# Patient Record
Sex: MALE | Race: WHITE | Employment: FULL TIME | URBAN - NONMETROPOLITAN AREA
[De-identification: names, ages, dates, MRNs, and addresses within clinical notes are randomized per-mention and may not be internally consistent; named-entity substitution may affect disease eponyms.]

---

## 2021-06-23 ENCOUNTER — OFFICE VISIT (OUTPATIENT)
Dept: ORTHOPEDIC SURGERY | Age: 66
End: 2021-06-23
Payer: COMMERCIAL

## 2021-06-23 VITALS — BODY MASS INDEX: 21.67 KG/M2 | HEIGHT: 72 IN | WEIGHT: 160 LBS

## 2021-06-23 DIAGNOSIS — M25.561 RIGHT KNEE PAIN, UNSPECIFIED CHRONICITY: Primary | ICD-10-CM

## 2021-06-23 PROCEDURE — 99203 OFFICE O/P NEW LOW 30 MIN: CPT | Performed by: ORTHOPAEDIC SURGERY

## 2021-06-23 NOTE — PROGRESS NOTES
6/23/2021     Reason for visit:  Right knee pain    History of Present Illness: The patient is a 64-year-old male who presents for evaluation of his right knee. He reports on and off pain for the past month. No traumatic injury that he is aware of. He is very active and does a lot of exercising. He does have the pain it is more medial.  No catching or locking. No instability. No swelling. Currently says he feels pretty good. Medical History:  No past medical history on file. No past surgical history on file. No family history on file. Social History     Socioeconomic History    Marital status:      Spouse name: Not on file    Number of children: Not on file    Years of education: Not on file    Highest education level: Not on file   Occupational History    Not on file   Tobacco Use    Smoking status: Never Smoker    Smokeless tobacco: Never Used   Substance and Sexual Activity    Alcohol use: Not on file    Drug use: Not on file    Sexual activity: Not on file   Other Topics Concern    Not on file   Social History Narrative    Not on file     Social Determinants of Health     Financial Resource Strain:     Difficulty of Paying Living Expenses:    Food Insecurity:     Worried About Running Out of Food in the Last Year:     [address] in the Last Year:    Transportation Needs:     Lack of Transportation (Medical):      Lack of Transportation (Non-Medical):    Physical Activity:     Days of Exercise per Week:     Minutes of Exercise per Session:    Stress:     Feeling of Stress :    Social Connections:     Frequency of Communication with Friends and Family:     Frequency of Social Gatherings with Friends and Family:     Attends [employer] Services:     Active Member of Clubs or Organizations:     Attends Club or Organization Meetings:     Marital Status:    Intimate Partner Violence:     Fear of Current or Ex-Partner:     Emotionally Abused:     Physically Abused:     Sexually Abused:       No current outpatient medications on file prior to visit. No current facility-administered medications on file prior to visit. No Known Allergies     Review of Systems:  Constitutional: Patient is adequately groomed with no evidence of malnutrition  Mental Status: The patient is oriented to time, place and person. The patient's mood and affect are appropriate. Lymphatic: The lymphatic examination bilaterally reveals all areas to be without enlargement or induration. Vascular: Examination reveals no swelling or calf tenderness. Peripheral pulses are palpable and 2+. Neurological: The patient has good coordination. There is no weakness or sensory deficit. Skin:  Head/Neck: inspection reveals no rashes, ulcerations or lesions. Trunk: inspection reveals no rashes, ulcerations or lesions. Objective:  Ht 6' (1.829 m)   Wt 160 lb (72.6 kg)   BMI 21.70 kg/m²      Physical Exam:  The patient is well-appearing and in no apparent distress  Examination of the right knee   There is no effusion, no gross deformity or skin changes  Range of motion reveals 0 to 130  neg lachman, negative posterior drawer, no pain or laxity with varus or valgus stress at 0 degrees and 30 degrees of flexion  + medial joint line tenderness  5 out of 5 strength throughout distal muscle groups  Sensation is intact to light touch throughout all distributions  There is no calf swelling or tenderness  Palpable DP pulse, brisk cap refill, 2+ symmetric reflexes    Imagin view x-rays of the right knee obtained in the office today on 2021 were reviewed. There is no fracture or dislocation. No other abnormality. Comparison x-rays of the left knee demonstrate no abnormality. Assessment:  Right knee pain. Suspect possible medial meniscus tear    Plan:  I discussed with the patient the diagnosis and treatment options. We discussed operative and nonoperative management.   At this point I do recommend nonoperative management. Nonoperative treatment options include activity modification, anti-inflammatory medications, physical therapy, and injections. He wishes to continue with observation and activity modification. If his symptoms worsen he will call us the next up would be either an MRI or cortisone injection to the knee. Bhavik Larry MD            Or[facility] and [address] and [address] [facility]            Team Physician [facility] ([place])      Disclaimer: This note was dictated with voice recognition software. Though review and correction are routine, we apologize for any errors.

## 2022-02-23 ENCOUNTER — OFFICE VISIT (OUTPATIENT)
Dept: ORTHOPEDIC SURGERY | Age: 67
End: 2022-02-23
Payer: COMMERCIAL

## 2022-02-23 VITALS — HEIGHT: 72 IN | BODY MASS INDEX: 21.67 KG/M2 | WEIGHT: 160 LBS

## 2022-02-23 DIAGNOSIS — M79.604 RIGHT LEG PAIN: Primary | ICD-10-CM

## 2022-02-23 PROCEDURE — 99214 OFFICE O/P EST MOD 30 MIN: CPT | Performed by: ORTHOPAEDIC SURGERY

## 2022-02-24 NOTE — PROGRESS NOTES
2/23/2022     Reason for visit:  Right lower extremity injury    History of Present Illness: The patient is a 73-year-old male who presents for evaluation. He reports that he was working out about a week ago when he started to develop pain within the posterior right lower extremity within the buttock and proximal hamstring region. He was doing squatting type of exercises. He continued to workouts aggressively and still had discomfort. Just recently started to decrease his activity level and does report the symptoms have improved somewhat. Really does not report much groin pain. No anterior lateral based pain. No pain that radiates down the leg. Objective:  Ht 6' (1.829 m)   Wt 160 lb (72.6 kg)   BMI 21.70 kg/m²      Physical Exam:  The patient is well-appearing and in no apparent distress  Examination of the right lower extremity  Full hip range of motion without pain, negative impingement tests  Mild tenderness within the proximal hamstring and ischial tuberosity region without evidence of palpable defect  5 out of 5 strength throughout distal muscle groups  Sensation is intact to light touch throughout all distributions  There is no calf swelling or tenderness  Palpable DP pulse, brisk cap refill, 2+ symmetric reflexes    Imaging:  AP pelvis x-ray was obtained in the office today on 2/23/2022 and reviewed. There is no fracture or dislocation. No other abnormality. Assessment:  Right lower extremity pain following injury. Suspect proximal hamstring strain. Possible component of lumbar radiculopathy    Plan:  I discussed with the patient the differential diagnosis. This point I would recommend modifying his activity. He is already feeling better since doing so. He will continue to avoid squats, lunges, aggressive running and jumping. He will then gradually return to full activity.   If he does remain symptomatic then he will call us and we could always consider an MRI of either the hip or the lumbar spine. Greater than 30 minutes were spent with this encounter. Time spent included evaluating the patient's chart prior to arrival.  Evaluating the patient in the office including history, physical examination, imaging reviewing, and counseling on next steps. Lastly, time was spent discussing orders with my staff as well as providing documentation in the chart. Clayton Rivera MD            Or[facility]opa[facility] Surgery Sports Medicine and [address] [facility]            Team Physician [facility] ([place])      Disclaimer: This note was dictated with voice recognition software. Though review and correction are routine, we apologize for any errors.

## 2022-03-01 ENCOUNTER — TELEPHONE (OUTPATIENT)
Dept: ORTHOPEDIC SURGERY | Age: 67
End: 2022-03-01

## 2022-03-01 DIAGNOSIS — M79.604 RIGHT LEG PAIN: Primary | ICD-10-CM

## 2022-03-01 NOTE — TELEPHONE ENCOUNTER
PT called in regards to MRI. Per patient he has to pay 100% out of pocket for MRI with insurance and is requesting to start physical therapy instead. Physical therapy ordered for [place] per Dr. Mariela Vazquez. PT given number to call to schedule.

## 2022-03-02 ENCOUNTER — HOSPITAL ENCOUNTER (OUTPATIENT)
Dept: PHYSICAL THERAPY | Age: 67
Setting detail: THERAPIES SERIES
Discharge: HOME | End: 2022-03-02

## 2022-03-02 NOTE — FLOWSHEET NOTE
[facility] [address] [place] [address] Office    Physical Therapy  Cancellation/No-show Note  Patient Name:  Nati Branham  :  1955   Date:  3/2/2022  Cancelled visits to date: 1  No-shows to date: 0    For today's appointment patient:  [x]  Cancelled  []  Rescheduled appointment  []  No-show     Reason given by patient:  []  Patient ill  []  Conflicting appointment  []  No transportation    []  Conflict with work  []  No reason given  [x]  Other:     Comments:  Does not want to spend the money. May contact us in the future if pain continues.     Electronically signed by:  Courtney Jeronimo, PT, DPT, MS, SCS